# Patient Record
Sex: FEMALE | Race: AMERICAN INDIAN OR ALASKA NATIVE
[De-identification: names, ages, dates, MRNs, and addresses within clinical notes are randomized per-mention and may not be internally consistent; named-entity substitution may affect disease eponyms.]

---

## 2020-04-29 NOTE — ULTRASOUND REPORT
ULTRASOUND-GUIDED NEEDLE CORE BIOPSY BREAST WITH CLIP PLACEMENT



CLINICAL: A palpable right breast mass at 12:00 12 cm from the nipple.



FINDINGS: The procedure was explained to the patient and informed consent was obtained.



Ultrasound demonstrated the previously identified 6 mm shadowing firm palpable relatively anechoic ma
ss near the skin at 12:00 12 cm from the nipple.

2. Adjacent smaller masses are also identified..



I marked the breast with a felt tip marker and a timeout was called. The skin was prepped with Chloro
-Prep and anesthetized with 1% lidocaine.



Needle core biopsy was performed through small dermatotomy using ultrasound guidance, 2% lidocaine wi
th epinephrine for deep anesthesia and a 14-gauge Achieve biopsy device. 2 cores were obtained and pl
aced in formalin. The lesion showed near complete collapse after 2 passes and a marker clip was deplo
yed within the lesion.



The patient tolerated the procedure well and there were no apparent complications. Hemostasis was ach
ieved with minimal effort and a sterile dressing was applied.



A post procedure mammogram was not performed. She left the department in good condition and was given
 instructions for wound care and follow-up.



IMPRESSION: Uncomplicated ultrasound guided needle core biopsy with clip placement right breast. My i
mpression at the time of the biopsy is that this is a benign oil cyst which demonstrated near complet
e collapse after 2 needle passes.



Signer Name: Filiberto Robert MD 

Signed: 4/29/2020 2:23 PM

Workstation Name: [id number]

## 2020-10-06 ENCOUNTER — HOSPITAL ENCOUNTER (OUTPATIENT)
Dept: HOSPITAL 5 - SPVWC | Age: 55
Discharge: HOME | End: 2020-10-06
Attending: SURGERY
Payer: COMMERCIAL

## 2020-10-06 DIAGNOSIS — N63.15: Primary | ICD-10-CM

## 2021-05-05 LAB
BUN SERPL-MCNC: 8 MG/DL (ref 7–17)
BUN/CREAT SERPL: 16 %
CALCIUM SERPL-MCNC: 8.8 MG/DL (ref 8.4–10.2)
HCT VFR BLD CALC: 38.4 % (ref 30.3–42.9)
HEMOLYSIS INDEX: 11
HGB BLD-MCNC: 12.6 GM/DL (ref 10.1–14.3)
MCHC RBC AUTO-ENTMCNC: 33 % (ref 30–34)
MCV RBC AUTO: 79 FL (ref 79–97)
PLATELET # BLD: 462 K/MM3 (ref 140–440)
RBC # BLD AUTO: 4.85 M/MM3 (ref 3.65–5.03)

## 2021-05-07 ENCOUNTER — HOSPITAL ENCOUNTER (OUTPATIENT)
Dept: HOSPITAL 5 - OR | Age: 56
Discharge: HOME | End: 2021-05-07
Attending: SURGERY
Payer: COMMERCIAL

## 2021-05-07 VITALS — DIASTOLIC BLOOD PRESSURE: 75 MMHG | SYSTOLIC BLOOD PRESSURE: 121 MMHG

## 2021-05-07 DIAGNOSIS — K80.10: Primary | ICD-10-CM

## 2021-05-07 DIAGNOSIS — F32.9: ICD-10-CM

## 2021-05-07 DIAGNOSIS — Z87.440: ICD-10-CM

## 2021-05-07 DIAGNOSIS — E66.9: ICD-10-CM

## 2021-05-07 DIAGNOSIS — Z79.899: ICD-10-CM

## 2021-05-07 DIAGNOSIS — K66.0: ICD-10-CM

## 2021-05-07 DIAGNOSIS — Z90.710: ICD-10-CM

## 2021-05-07 DIAGNOSIS — Z20.822: ICD-10-CM

## 2021-05-07 DIAGNOSIS — Z98.890: ICD-10-CM

## 2021-05-07 DIAGNOSIS — Z87.01: ICD-10-CM

## 2021-05-07 DIAGNOSIS — D64.9: ICD-10-CM

## 2021-05-07 DIAGNOSIS — E11.9: ICD-10-CM

## 2021-05-07 DIAGNOSIS — F41.9: ICD-10-CM

## 2021-05-07 DIAGNOSIS — I10: ICD-10-CM

## 2021-05-07 PROCEDURE — 88304 TISSUE EXAM BY PATHOLOGIST: CPT

## 2021-05-07 PROCEDURE — 47562 LAPAROSCOPIC CHOLECYSTECTOMY: CPT

## 2021-05-07 PROCEDURE — 36415 COLL VENOUS BLD VENIPUNCTURE: CPT

## 2021-05-07 PROCEDURE — 85027 COMPLETE CBC AUTOMATED: CPT

## 2021-05-07 PROCEDURE — 80048 BASIC METABOLIC PNL TOTAL CA: CPT

## 2021-05-07 PROCEDURE — U0003 INFECTIOUS AGENT DETECTION BY NUCLEIC ACID (DNA OR RNA); SEVERE ACUTE RESPIRATORY SYNDROME CORONAVIRUS 2 (SARS-COV-2) (CORONAVIRUS DISEASE [COVID-19]), AMPLIFIED PROBE TECHNIQUE, MAKING USE OF HIGH THROUGHPUT TECHNOLOGIES AS DESCRIBED BY CMS-2020-01-R: HCPCS

## 2021-05-07 PROCEDURE — 82962 GLUCOSE BLOOD TEST: CPT

## 2021-05-07 NOTE — ANESTHESIA CONSULTATION
Anesthesia Consult and Med Hx


Date of service: 05/07/21





- Airway


Anesthetic Teeth Evaluation: Chipped (Missing)


ROM Head & Neck: Adequate


Mental/Hyoid Distance: Adequate


Mallampati Class: Class II


Intubation Access Assessment: Good





- Pre-Operative Health Status


ASA Pre-Surgery Classification: ASA2


Proposed Anesthetic Plan: General





- Pulmonary


Hx Respiratory Symptoms: No (+2FS)


Hx Pneumonia:  (IN 2012)





- Cardiovascular System


Hx Hypertension: Yes





- Central Nervous System


Hx Back Pain: Yes (Hx scoliosis as a child)


Hx Psychiatric Problems: Yes (Anxiety/Depression)





- Gastrointestinal


Hx Ulcer: Yes (in the past)


Hx Gastroesophageal Reflux Disease: No





- Endocrine


Hx Non-Insulin Dependent Diabetes: Yes (Diet-controlled)





- Hematic


Hx Anemia: Yes (TREATED WITH IRON INFUSION)





- Other Systems


Hx Alcohol Use: No


Hx Substance Use: No


Hx Cancer: No


Hx Obesity: Yes

## 2021-05-07 NOTE — SHORT STAY SUMMARY
Short Stay Documentation


Date of service: 05/07/21





- History


Principal diagnosis: symptomatic cholethiasis


H&P: obtained from office





- Allergies and Medications


Current Medications: 


                                    Allergies





No Known Allergies Allergy (Unverified 05/03/21 16:05)


   





                                Home Medications











 Medication  Instructions  Recorded  Confirmed  Last Taken  Type


 


Amlodipine Besylate/Valsartan 1 each PO DAILY 05/03/21 05/07/21 05/07/21 07:00 

History





[Amlodipine-Valsartan  mg]     


 


Escitalopram Oxalate [Lexapro] 20 mg PO DAILY 05/03/21 05/07/21 05/07/21 07:00 

History


 


Ibuprofen [Motrin] 800 mg PO Q8HR PRN 05/03/21 05/07/21 05/02/21 09:00 History


 


atenoloL [Tenormin] 25 mg PO DAILY 05/03/21 05/07/21 05/07/21 07:00 History


 


HYDROcodone/APAP 5-325 [[place] 1 each PO Q4HR PRN #20 tablet 05/07/21  Unknown Rx





5/325]     








Active Medications





Cefazolin Sodium (Cefazolin/Sterile Water 2 Gm/20 Ml Syringe)  2 gm IV PREOP NR


   Stop: 05/07/21 20:00


Celecoxib (Celecoxib 200 Mg Cap)  400 mg PO PREOP NR


   Stop: 05/07/21 19:00


   Last Admin: 05/07/21 09:52 Dose:  400 mg


   Documented by: 


Gabapentin (Gabapentin 300 Mg Cap)  300 mg PO PREOP NR


   Stop: 05/07/21 19:00


   Last Admin: 05/07/21 09:53 Dose:  300 mg


   Documented by: 


Hydromorphone HCl (Hydromorphone 1 Mg/1 Ml Inj)  0.25 mg IV Q10MIN PRN


   PRN Reason: Pain, Moderate (4-6)


Hydromorphone HCl (Hydromorphone 1 Mg/1 Ml Inj)  0.5 mg IV Q10MIN PRN


   PRN Reason: Pain , Severe (7-10)


Lactated Ringer's (Lactated Ringers)  1,000 mls @ 125 mls/hr IV AS DIRECT CORWIN


   Last Admin: 05/07/21 09:30 Dose:  125 mls/hr


   Documented by: 


Midazolam HCl (Midazolam 2 Mg/2 Ml Inj)  2 mg IV PREOP NR


   Stop: 05/07/21 23:59


Ondansetron HCl (Ondansetron 4 Mg/2 Ml Inj)  4 mg IV ONCE PRN


   PRN Reason: Nausea And Vomiting











- Brief post op/procedure progress note


Date of procedure: 05/07/21


Pre-op diagnosis: symptomatic cholelithiasis


Post-op diagnosis: other (chronic cholecystitis)


Procedure: 





laparoscopic cholecystectomy


Anesthesia: GETA, local


Findings: 





Chronic gallbladder wall thickening, contracted gallbladder with large stone in 

body


Dense adhesions from gallbladder to transverse colon and omentum


Surgeon: DIEGO MATAMOROS


Estimated blood loss: minimal


Pathology: list (gallbladder)


Specimen disposition: to lab


Condition: stable





- Hospital course


Hospital course: 





Pt observed in PACU and discharged to home in stable condition when criteria met





- Disposition


Condition at discharge: Good


Disposition: DC-01 TO HOME OR SELFCARE





Short Stay Discharge Plan


Activity: other (no heavy lifting for 1 week)


Diet: low fat


Wound: open to air


Additional Instructions: 


SEE PRINTED DISCHARGE INSTRUCTIONS


Follow up with: 


MARY PARK MD [Primary Care Provider] - 7 Days


DIEGO MATAMOROS DO [Staff Physician] - 14 Days


Prescriptions: 


HYDROcodone/APAP 5-325 [[place] 5/325] 1 each PO Q4HR PRN #20 tablet


 PRN Reason: Pain , Severe (7-10)

## 2021-05-07 NOTE — OPERATIVE REPORT
Operative Report


Operative Report: 


Date of procedure: 05/07/21


Pre-op diagnosis: symptomatic cholelithiasis


Post-op diagnosis: other (chronic cholecystitis)


Procedure: 


laparoscopic cholecystectomy


Anesthesia: GETA, local


Findings: 


Chronic gallbladder wall thickening, contracted gallbladder with large stone in 

body


Dense adhesions from gallbladder to transverse colon and omentum


Surgeon: DIEGO MATAMOROS


Assistant: SUSHIL Schmitz


Estimated blood loss: minimal


Pathology: list (gallbladder)


Specimen disposition: to lab


Condition: stable


Hospital course: 


Pt observed in PACU and discharged to home in stable condition when criteria met


Condition at discharge: Good


Disposition: DC-01 TO HOME OR SELFCARE








HPI an indication: 55-year-old female who presented to the surgery clinic with 

complaints of intermittent sharp right upper quadrant abdominal pain.  Patient 

states that the pain is been ongoing for 2 years but have become more frequent 

in the last few months.  The pain was associated with fatty foods and often led 

to nausea and vomiting.  An ultrasound was performed which showed a 1.7 cm stone

in the gallbladder without evidence of cholecystitis or bile duct dilatation.  

It was recommended that the patient undergo cholecystectomy.  All risks, 

benefits, alternatives to surgery were discussed in detail and questions 

answered.  Consent was obtained for laparoscopic, possible open cholecystectomy,

possible cholangiogram. 





Procedure in detail: The patient was identified in the preoperative area and 

taken back to the operating room, placed on the operating room table in supine 

position.  After anesthesia was induced, the abdomen was prepped and draped in 

usual sterile fashion and timeout was performed.  Local anesthetic was 

infiltrated into all of the skin incision sites.  Using an 11 blade, a 

supraumbilical incision was made through which a Veress needle was inserted.  

The position of the veress needle was confirmed with the saline drop test and 

the abdomen was then insufflated to 15 mmHg without incident.  The veress needle

was then removed and a 5 mm Optiview trocar placed through this incision.  The 

abdomen was then inspected and there was no underlying injury to any of the 

abdominal contents. An additional 12 mm subxyphoid port, and 2, 5mm RUQ ports 

were then placed under direct visualization. The patient was then placed into 

reverse Trendelberg and tilted to the left.  The gallbladder was partially only 

visualized due to adhesions from the colon to the gallbladder obscuring it.  The

fundus 1. Based on the patient's history, physical exam, and imaging findings, 

her pain is consistent with symptomatic cholelithiasis


2. Recommend cholecystectomy. I discussed all risk, benefits, alternatives to 

surgery with the patient and questions were answered. She is agreeable to 

proceed and consent was obtained for laparoscopic cholecystectomy, possible 

open, possible cholangiogram. 


3. Paperwork to be sent to [facility] to schedule surgery at next available date. 


4. Preoperative Covid testing will be scheduled.   Was identified and retracted 

cephalad.  A very meticulous dissection was then performed in order to free the 

adhesions of the colon.  Using a combination of blunt dissection, sharp 

dissection with EndoShears, electrocautery with the hook the colon was very 

carefully dissected from the gallbladder.  The colon was identified and no 

identifiable bowel injury was seen.  The gallbladder was then able to be fully 

visualized and retracted cephalad to the liver.  The gallbladder wall appeared 

chronically thickened and a large stone within the mid body.  The cystic duct 

and artery were then carefully dissected.  Due to the chronic inflammation of 

the gallbladder wall it was decided to perform a dome down approach in order to 

obtain a critical view.  The gallbladder was dissected from the liver starting 

at and the fundus.  Hemostasis was achieved along the way.  Once the gallbladder

was completely dissected off the liver bed, it was clear that the cystic duct 

and artery were the only two structures entering the gallbladder.  The critical 

view was successfully obtained.  Three clips were then placed on the proximal 

aspect of the cystic duct and one clip distally, and 2 clips on the cystic 

artery proximally and one distal.  The cystic duct and cystic artery were then 

transected in between the clips using EndoShears.  The gallbladder was placed 

into a Endo Catch bag and removed from the abdomen via the 12mm port.  Upon 

inspection the gallbladder was very contracted and contained a medium size stone

the gallbladder fossa was then inspected and there was no identifiable bleeding 

or bile leakage.  Hemostasis was ensured.  The clips on the cystic duct and 

artery were visualized and intact.  The patient was then placed into neutral po

sition and Morison's pouch was irrigated and the irrigant returned clear.  The 

12 mm port fascia was closed with interrupted 0 Vicryl sutures using the Miko 

Zhong device.  Mackenzie powder was sprayed onto the liver bed for additional 

hemostasis.  The remaining ports were removed under direct visualization.  Skin 

incisions were closed with 4-0 Monocryl subcuticular stitches and skin glue.  

All skin incisions were once again infiltrated with local anesthetic.





At the end case all sponge, instrument, sharp counts were correct 2.  The 

patient was awoken from anesthesia, extubated, and taken to PACU in stable 

condition.